# Patient Record
Sex: MALE | Race: OTHER
[De-identification: names, ages, dates, MRNs, and addresses within clinical notes are randomized per-mention and may not be internally consistent; named-entity substitution may affect disease eponyms.]

---

## 2019-11-07 ENCOUNTER — HOSPITAL ENCOUNTER (INPATIENT)
Dept: HOSPITAL 54 - ER | Age: 44
Discharge: HOME | DRG: 69 | End: 2019-11-07
Attending: INTERNAL MEDICINE | Admitting: INTERNAL MEDICINE
Payer: MEDICARE

## 2019-11-07 VITALS — BODY MASS INDEX: 38.06 KG/M2 | HEIGHT: 69 IN | WEIGHT: 257 LBS

## 2019-11-07 VITALS — DIASTOLIC BLOOD PRESSURE: 78 MMHG | SYSTOLIC BLOOD PRESSURE: 122 MMHG

## 2019-11-07 DIAGNOSIS — F17.210: ICD-10-CM

## 2019-11-07 DIAGNOSIS — F41.9: ICD-10-CM

## 2019-11-07 DIAGNOSIS — E78.5: ICD-10-CM

## 2019-11-07 DIAGNOSIS — I10: ICD-10-CM

## 2019-11-07 DIAGNOSIS — E11.9: ICD-10-CM

## 2019-11-07 DIAGNOSIS — F40.240: ICD-10-CM

## 2019-11-07 DIAGNOSIS — G45.9: Primary | ICD-10-CM

## 2019-11-07 DIAGNOSIS — G51.0: ICD-10-CM

## 2019-11-07 LAB
BASOPHILS # BLD AUTO: 0 /CMM (ref 0–0.2)
BASOPHILS NFR BLD AUTO: 0.7 % (ref 0–2)
BUN SERPL-MCNC: 14 MG/DL (ref 7–18)
CALCIUM SERPL-MCNC: 8.8 MG/DL (ref 8.5–10.1)
CHLORIDE SERPL-SCNC: 104 MMOL/L (ref 98–107)
CHOLEST SERPL-MCNC: 258 MG/DL (ref ?–200)
CO2 SERPL-SCNC: 26 MMOL/L (ref 21–32)
CREAT SERPL-MCNC: 0.7 MG/DL (ref 0.6–1.3)
EOSINOPHIL NFR BLD AUTO: 2.3 % (ref 0–6)
GLUCOSE SERPL-MCNC: 173 MG/DL (ref 74–106)
HCT VFR BLD AUTO: 52 % (ref 39–51)
HDLC SERPL-MCNC: 32 MG/DL (ref 40–60)
HGB BLD-MCNC: 17.9 G/DL (ref 13.5–17.5)
LDLC SERPL DIRECT ASSAY-MCNC: 181 MG/DL (ref 0–99)
LYMPHOCYTES NFR BLD AUTO: 2.5 /CMM (ref 0.8–4.8)
LYMPHOCYTES NFR BLD AUTO: 35.8 % (ref 20–44)
MCHC RBC AUTO-ENTMCNC: 34 G/DL (ref 31–36)
MCV RBC AUTO: 96 FL (ref 80–96)
MONOCYTES NFR BLD AUTO: 0.4 /CMM (ref 0.1–1.3)
MONOCYTES NFR BLD AUTO: 5.8 % (ref 2–12)
NEUTROPHILS # BLD AUTO: 3.9 /CMM (ref 1.8–8.9)
NEUTROPHILS NFR BLD AUTO: 55.4 % (ref 43–81)
PLATELET # BLD AUTO: 207 /CMM (ref 150–450)
POTASSIUM SERPL-SCNC: 4.3 MMOL/L (ref 3.5–5.1)
RBC # BLD AUTO: 5.45 MIL/UL (ref 4.5–6)
SODIUM SERPL-SCNC: 138 MMOL/L (ref 136–145)
TRIGL SERPL-MCNC: 235 MG/DL (ref 30–150)
WBC NRBC COR # BLD AUTO: 7 K/UL (ref 4.3–11)

## 2019-11-07 PROCEDURE — G0378 HOSPITAL OBSERVATION PER HR: HCPCS

## 2019-11-07 NOTE — NUR
CAME IN FOR L SIDED FACIAL/ L SIDED BODY NUMBNESS SINCE 11AM YESTERDAY. TO ER 
BED 11, HOOKED TO MONITOR, CHANGED TO HOSPITAL GOWN, AWAITING MD GUERRA.

## 2019-11-07 NOTE — NUR
TELE RN NOTES 



PATIENT AWAKE ALERT ORIENTED X 4. PT IS STABLE AND AMBULATORY. BREATHING EVEN AND 
UNLABORED.DENIES PAIN, NO ACUTE DISTRESS, NO SOB. IV ON RAC IS CLEAN, PATENT, DRY, AND 
INTACT. SHOWS NO REDNESS, NO INFILTRATION. SAFETY PRECAUTIONS ARE IN PLACE. ALL PATIENT 
NEEDS MET AND ATTENDED.  BED IN LOWEST POSITION, LOCKED. CALL LIGHT KEPT WITHIN REACH. WILL 
ENDORSE TO NIGHT SHIFT NURSE.

## 2019-11-07 NOTE — NUR
TELE RN NOTES



CALL BACK RECEIVED FROM DR. SILVA AGAIN 1820. STATED HE WILL PUT IN ADMITTING ORDER IN A 
FEW MINUTES. WILL AWAIT FOR ORDERS. WILL CONTINUE TO MONITOR

## 2019-11-07 NOTE — NUR
ADMISSION TELE NOTES



PATIENT CAME IN STABLE CONDITION, AWAKE ALERT ORIENTED X 4. PATIENT BROUGHT UP TO FLOOR BY 
[place]. BREATHING IS EVEN AND UNLABORED. DENIES PAIN, NO ACUTE DISTRESS, NO SOB. IV ON RAC, 
PATENT AND INTACT. NO REDNESS NO INFILTRATION. SKIN DRY AND INTACT. [facility] [[email or web address]] ASSESSMENT DONE 
AND NOTED TO HAVE SLIGHT LEFT SIDED FACIAL WEAKNESS. PATIENT NOTED TO BE AMBULATORY AND BRP. 
MD NOTIFIED PATIENT WAS ON FLOOR. AWAITING ADMITTING ORDERS. SAFETY PRECAUTIONS IN PLACE. 
BED IN LOWEST POSITION AND LOCKED, 2 SIDE RAILS UP, AND CALL LIGHT KEPT WITHIN REACH. FAMILY 
AT BEDSIDE. WILL CONTINUE MONITOR. 

-------------------------------------------------------------------------------

Addendum: 11/07/19 at 1601 by EZE DENT RN

-------------------------------------------------------------------------------

PATIENT IS SINUS RHYTHM IN 80'S

## 2019-11-07 NOTE — NUR
TELE RN NOTES



CALL BACK WAS RECEIVED FROM DR. SILVA. STATED HE WILL PUT IN ADMITTING ORDER IN A FEW 
MINUTES. WILL AWAIT FOR ORDERS. WILL CONTINUE TO MONITOR.

## 2019-11-07 NOTE — NUR
TELE RN NOTES



PT LEFT THE FACILITY IN FAIR & STABLE CONDITION. VSS. AFEBRILE. SKIN INTACT. IV-HL D/CD. PT 
TOLERATED WELL.

## 2019-11-07 NOTE — NUR
TELE RN NOTES



DOCTOR ZAK WAS PAGED AT 0619 REGARDING ADMITTED ORDERS. AWAITING CALL BACK, WILL 
CONTINUE TO MONITOR.

## 2019-11-07 NOTE — NUR
TELE RN NOTE



ORDERS PLACED BY  AT THIS TIME, WILL IMPLEMENT AT THIS TIME AND CONTINUE TO 
MONITOR

## 2019-11-07 NOTE — NUR
MS RN NOTES



DR MCDANIELS CAME IN WITH NEW ORDERS TO D/C PT. PT WANTS TO GO HOME ASAP. DISCHARGE 
INSTRUCTIONS PROVIDED. PRESCRIPTIONS GIVEN TO PT AS WELL AS BELONGINGS. UNDERSTOOD D/C 
INSTRUCTIONS WELL. PER PT, HE DOESN'T WANT THE MRI TO BE DONE BECAUSE HE IS CLAUSTROPHOBIC. 
WILL HAVE OPEN MRI DONE OUTSIDE AND WILL SEE HIS PMD MARY FOR HIS NEURO F/U. FAMILY AT 
BEDSIDE AWAITING FOR PT'S D/C. WILL CONTINUE TO MONITOR.